# Patient Record
Sex: MALE | Race: AMERICAN INDIAN OR ALASKA NATIVE | URBAN - METROPOLITAN AREA
[De-identification: names, ages, dates, MRNs, and addresses within clinical notes are randomized per-mention and may not be internally consistent; named-entity substitution may affect disease eponyms.]

---

## 2023-04-04 ENCOUNTER — OFFICE VISIT (OUTPATIENT)
Dept: URBAN - METROPOLITAN AREA CLINIC 13 | Facility: CLINIC | Age: 58
End: 2023-04-04
Payer: COMMERCIAL

## 2023-04-04 DIAGNOSIS — H35.341 MACULAR CYST, HOLE, OR PSEUDOHOLE, RIGHT EYE: Primary | ICD-10-CM

## 2023-04-04 DIAGNOSIS — H25.13 AGE-RELATED NUCLEAR CATARACT, BILATERAL: ICD-10-CM

## 2023-04-04 PROCEDURE — 99204 OFFICE O/P NEW MOD 45 MIN: CPT | Performed by: OPHTHALMOLOGY

## 2023-04-04 PROCEDURE — 92134 CPTRZ OPH DX IMG PST SGM RTA: CPT | Performed by: OPHTHALMOLOGY

## 2023-04-04 ASSESSMENT — INTRAOCULAR PRESSURE
OD: 14
OS: 13

## 2023-04-04 NOTE — IMPRESSION/PLAN
Impression: Macular cyst, hole, or pseudohole, right eye: H35.341. 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 The diagnosis, natural history, and prognosis of FTMH, as well as the R/B/A of the various treatment options were discussed. Given the patient's current visual acuity and hindrance on activities of daily living, surgical correction was recommended. The patient was informed of altitude precautions and the danger of blindness or loss of the eye with travel to higher altitude or with flying. The patient was instructed that face-down positioning was vital post-operatively. The patient understands that if the hole is successfully closed, the vision usually improves; however the vision does not typically return to normal, and improvement usually takes place gradually over a period of several months to one year. Discussed that repeat surgery is required in ~5% of cases.  

Surgery OD: 25gPPV/MP/ICG/ILM/ SOin/5- 7 days face down  x FTMH

## 2023-04-11 ENCOUNTER — POST-OPERATIVE VISIT (OUTPATIENT)
Dept: URBAN - METROPOLITAN AREA CLINIC 13 | Facility: CLINIC | Age: 58
End: 2023-04-11
Payer: COMMERCIAL

## 2023-04-11 DIAGNOSIS — H35.341 MACULAR CYST, HOLE, OR PSEUDOHOLE, RIGHT EYE: Primary | ICD-10-CM

## 2023-04-11 PROCEDURE — 99024 POSTOP FOLLOW-UP VISIT: CPT | Performed by: OPHTHALMOLOGY

## 2023-04-11 ASSESSMENT — INTRAOCULAR PRESSURE
OS: 18
OD: 15

## 2023-04-11 NOTE — IMPRESSION/PLAN
Impression: S/P 25G PPV/MP/ICG/ILM/SO OD - . Macular cyst, hole, or pseudohole, right eye  H35.341. Plan: --Excellent post op course --Instructions reviewed
--Condition is improving
--Positioning discussed, 5- 7 days face down --No s/s of infection
--IOP normal

RTC: 1-2 wks POS OD with OCT OD

## 2023-04-26 ENCOUNTER — POST-OPERATIVE VISIT (OUTPATIENT)
Facility: LOCATION | Age: 58
End: 2023-04-26
Payer: COMMERCIAL

## 2023-04-26 DIAGNOSIS — Z48.810 ENCOUNTER FOR SURGICAL AFTERCARE FOLLOWING SURGERY ON THE SENSE ORGANS: ICD-10-CM

## 2023-04-26 DIAGNOSIS — H35.341 MACULAR CYST, HOLE, OR PSEUDOHOLE, RIGHT EYE: Primary | ICD-10-CM

## 2023-04-26 PROCEDURE — 99024 POSTOP FOLLOW-UP VISIT: CPT | Performed by: OPHTHALMOLOGY

## 2023-04-26 RX ORDER — DORZOLAMIDE HYDROCHLORIDE AND TIMOLOL MALEATE 20; 5 MG/ML; MG/ML
SOLUTION/ DROPS OPHTHALMIC
Qty: 5 | Refills: 2 | Status: ACTIVE
Start: 2023-04-26

## 2023-04-26 ASSESSMENT — INTRAOCULAR PRESSURE
OS: 19
OD: 31

## 2023-04-26 NOTE — IMPRESSION/PLAN
Impression: S/P 25G PPV/MP/ICG/ILM/SO OD - 15 Days. Macular cyst, hole, or pseudohole, right eye  H35.341. Plan: --Excellent post op course -- hole closed! --Instructions reviewed
--Condition is improving
-- start Cosopt BID

RTC: 4-6 wks POS OS with OCT OU

## 2023-05-24 ENCOUNTER — POST-OPERATIVE VISIT (OUTPATIENT)
Facility: LOCATION | Age: 58
End: 2023-05-24
Payer: COMMERCIAL

## 2023-05-24 DIAGNOSIS — H35.341 MACULAR CYST, HOLE, OR PSEUDOHOLE, RIGHT EYE: Primary | ICD-10-CM

## 2023-05-24 PROCEDURE — 99024 POSTOP FOLLOW-UP VISIT: CPT | Performed by: OPHTHALMOLOGY

## 2023-05-24 ASSESSMENT — INTRAOCULAR PRESSURE
OD: 14
OS: 18

## 2023-05-24 NOTE — IMPRESSION/PLAN
Impression: S/P 25G PPV/MP/ICG/ILM/SO OD - 43 Days. Macular cyst, hole, or pseudohole, right eye  H35.341. Plan: -- + TRD inferiorly; rec face down positioning 4hrs/day
--Hole remains closed --Will follow to assess need for possible RD repair. 
-- IOP improved; cont cosopt BID

RTC: 4-6 wks POS OD with OCT OU

## 2023-06-21 ENCOUNTER — POST-OPERATIVE VISIT (OUTPATIENT)
Facility: LOCATION | Age: 58
End: 2023-06-21
Payer: COMMERCIAL

## 2023-06-21 DIAGNOSIS — H35.341 MACULAR CYST, HOLE, OR PSEUDOHOLE, RIGHT EYE: Primary | ICD-10-CM

## 2023-06-21 PROCEDURE — 92012 INTRM OPH EXAM EST PATIENT: CPT | Performed by: OPHTHALMOLOGY

## 2023-06-21 PROCEDURE — 92134 CPTRZ OPH DX IMG PST SGM RTA: CPT | Performed by: OPHTHALMOLOGY

## 2023-06-21 ASSESSMENT — INTRAOCULAR PRESSURE
OD: 10
OS: 16

## 2023-06-21 NOTE — IMPRESSION/PLAN
Impression: 1) S/P 25G PPV/MP/ICG/ILM/SO OD - 71 Days. Macular cyst, hole, or pseudohole, right eye  H35.341.
2) TRD with PVR inferiorly OD Plan: 1) Hole closed. 2) Rec surgery to repair inferior RD.   

RTC surgery OD: 25gPPV/SOout/MP/poss inferior retinectomy/EL/SOin x RD/PVR OD

## 2023-07-19 ENCOUNTER — POST-OPERATIVE VISIT (OUTPATIENT)
Facility: LOCATION | Age: 58
End: 2023-07-19
Payer: COMMERCIAL

## 2023-07-19 DIAGNOSIS — H33.41 PROLIFERATIVE VITREO-RETINOPATHY W/ RETINAL DETACHMENT OF RIGHT EYE: Primary | ICD-10-CM

## 2023-07-19 PROCEDURE — 99024 POSTOP FOLLOW-UP VISIT: CPT | Performed by: OPHTHALMOLOGY

## 2023-07-19 ASSESSMENT — INTRAOCULAR PRESSURE
OD: 16
OS: 20

## 2023-07-19 NOTE — IMPRESSION/PLAN
Impression: S/P 25G PPV/SO OUT/MP/EL OD - 1 Day. 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 Plan: - -- inferior TRD remains barricaded behind laser. Discussed risk of RD progression.  
-- drops per protocol

--RTC 1-2 wks POS, OCT OD

## 2023-08-02 ENCOUNTER — POST-OPERATIVE VISIT (OUTPATIENT)
Facility: LOCATION | Age: 58
End: 2023-08-02
Payer: COMMERCIAL

## 2023-08-02 DIAGNOSIS — H33.41 PROLIFERATIVE VITREO-RETINOPATHY W/ RETINAL DETACHMENT OF RIGHT EYE: Primary | ICD-10-CM

## 2023-08-02 PROCEDURE — 99024 POSTOP FOLLOW-UP VISIT: CPT | Performed by: OPHTHALMOLOGY

## 2023-08-02 PROCEDURE — 67145 PROPH RTA DTCHMNT PC: CPT | Performed by: OPHTHALMOLOGY

## 2023-08-02 ASSESSMENT — INTRAOCULAR PRESSURE
OD: 13
OS: 15

## 2023-09-27 ENCOUNTER — POST-OPERATIVE VISIT (OUTPATIENT)
Facility: LOCATION | Age: 58
End: 2023-09-27
Payer: COMMERCIAL

## 2023-09-27 DIAGNOSIS — H33.41 PROLIFERATIVE VITREO-RETINOPATHY W/ RETINAL DETACHMENT OF RIGHT EYE: Primary | ICD-10-CM

## 2023-09-27 PROCEDURE — 99024 POSTOP FOLLOW-UP VISIT: CPT | Performed by: OPHTHALMOLOGY

## 2023-09-27 ASSESSMENT — INTRAOCULAR PRESSURE
OD: 10
OS: 15

## 2023-10-18 ENCOUNTER — OFFICE VISIT (OUTPATIENT)
Dept: URBAN - METROPOLITAN AREA CLINIC 64 | Facility: LOCATION | Age: 58
End: 2023-10-18
Payer: COMMERCIAL

## 2023-10-18 DIAGNOSIS — H25.12 AGE-RELATED NUCLEAR CATARACT, LEFT EYE: ICD-10-CM

## 2023-10-18 DIAGNOSIS — H25.811 COMBINED FORMS OF AGE-RELATED CATARACT, RIGHT EYE: Primary | ICD-10-CM

## 2023-10-18 DIAGNOSIS — H33.41 PROLIFERATIVE VITREO-RETINOPATHY W/ RETINAL DETACHMENT OF RIGHT EYE: ICD-10-CM

## 2023-10-18 DIAGNOSIS — H52.223 REGULAR ASTIGMATISM, BILATERAL: ICD-10-CM

## 2023-10-18 PROCEDURE — 99204 OFFICE O/P NEW MOD 45 MIN: CPT | Performed by: OPHTHALMOLOGY

## 2023-10-18 PROCEDURE — 92136 OPHTHALMIC BIOMETRY: CPT | Performed by: OPHTHALMOLOGY

## 2023-10-18 ASSESSMENT — KERATOMETRY
OD: 44.12
OS: 44.32

## 2023-10-18 ASSESSMENT — VISUAL ACUITY: OD: 20/150

## 2023-10-18 ASSESSMENT — INTRAOCULAR PRESSURE
OS: 30
OD: 18

## 2023-10-25 ENCOUNTER — ADULT PHYSICAL (OUTPATIENT)
Dept: URBAN - METROPOLITAN AREA CLINIC 64 | Facility: LOCATION | Age: 58
End: 2023-10-25
Payer: COMMERCIAL

## 2023-10-25 DIAGNOSIS — Z01.818 ENCOUNTER FOR OTHER PREPROCEDURAL EXAMINATION: Primary | ICD-10-CM

## 2023-10-25 DIAGNOSIS — H25.811 COMBINED FORMS OF AGE-RELATED CATARACT, RIGHT EYE: ICD-10-CM

## 2023-10-25 PROCEDURE — 99203 OFFICE O/P NEW LOW 30 MIN: CPT | Performed by: NURSE PRACTITIONER

## 2024-01-09 ENCOUNTER — SURGERY (OUTPATIENT)
Dept: URBAN - METROPOLITAN AREA SURGERY 42 | Facility: LOCATION | Age: 59
End: 2024-01-09
Payer: OTHER GOVERNMENT

## 2024-01-09 PROCEDURE — 66984 XCAPSL CTRC RMVL W/O ECP: CPT | Performed by: OPHTHALMOLOGY

## 2024-01-09 PROCEDURE — PR1CP PR1CP: CUSTOM | Performed by: OPHTHALMOLOGY

## 2024-01-10 ENCOUNTER — POST-OPERATIVE VISIT (OUTPATIENT)
Dept: URBAN - METROPOLITAN AREA CLINIC 64 | Facility: LOCATION | Age: 59
End: 2024-01-10
Payer: OTHER GOVERNMENT

## 2024-01-10 ASSESSMENT — INTRAOCULAR PRESSURE: OD: 15

## 2024-01-16 ENCOUNTER — POST-OPERATIVE VISIT (OUTPATIENT)
Dept: URBAN - METROPOLITAN AREA CLINIC 64 | Facility: LOCATION | Age: 59
End: 2024-01-16
Payer: OTHER GOVERNMENT

## 2024-01-16 ASSESSMENT — INTRAOCULAR PRESSURE: OD: 22

## 2024-01-30 ENCOUNTER — POST-OPERATIVE VISIT (OUTPATIENT)
Dept: URBAN - METROPOLITAN AREA CLINIC 64 | Facility: LOCATION | Age: 59
End: 2024-01-30
Payer: COMMERCIAL

## 2024-01-30 DIAGNOSIS — H35.341 MACULAR CYST, HOLE, OR PSEUDOHOLE, RIGHT EYE: ICD-10-CM

## 2024-01-30 RX ORDER — PREDNISOLONE ACETATE 10 MG/ML
1 % SUSPENSION/ DROPS OPHTHALMIC
Qty: 10 | Refills: 0 | Status: ACTIVE
Start: 2024-01-30

## 2024-01-30 ASSESSMENT — INTRAOCULAR PRESSURE
OD: 17
OS: 19

## 2024-01-31 ENCOUNTER — OFFICE VISIT (OUTPATIENT)
Facility: LOCATION | Age: 59
End: 2024-01-31
Payer: COMMERCIAL

## 2024-01-31 DIAGNOSIS — Z48.810 ENCOUNTER FOR SURGICAL AFTERCARE FOLLOWING SURGERY ON A SENSE ORGAN: ICD-10-CM

## 2024-01-31 DIAGNOSIS — H33.41 PROLIFERATIVE VITREO-RETINOPATHY W/ RETINAL DETACHMENT OF RIGHT EYE: ICD-10-CM

## 2024-01-31 PROCEDURE — 92014 COMPRE OPH EXAM EST PT 1/>: CPT | Performed by: OPHTHALMOLOGY

## 2024-01-31 PROCEDURE — 92134 CPTRZ OPH DX IMG PST SGM RTA: CPT | Performed by: OPHTHALMOLOGY

## 2024-01-31 ASSESSMENT — INTRAOCULAR PRESSURE
OS: 20
OD: 20